# Patient Record
Sex: FEMALE | Race: WHITE
[De-identification: names, ages, dates, MRNs, and addresses within clinical notes are randomized per-mention and may not be internally consistent; named-entity substitution may affect disease eponyms.]

---

## 2017-10-25 ENCOUNTER — HOSPITAL ENCOUNTER (EMERGENCY)
Dept: HOSPITAL 92 - ERS | Age: 22
Discharge: HOME | End: 2017-10-25
Payer: SELF-PAY

## 2017-10-25 DIAGNOSIS — F32.9: ICD-10-CM
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
## 2017-10-25 DIAGNOSIS — S82.64XA: Primary | ICD-10-CM

## 2017-10-25 DIAGNOSIS — F41.9: ICD-10-CM

## 2017-10-25 PROCEDURE — 29515 APPLICATION SHORT LEG SPLINT: CPT

## 2017-10-25 NOTE — RAD
RIGHT ANKLE THREE VIEWS:

 

History: Right ankle pain, injury. 

 

FINDINGS: 

Soft tissue swelling is present. There is a fracture involving the lateral malleolus without signifi
cant displacement. The ankle mortise is maintained. 

 

IMPRESSION: 

Right lateral malleolar fracture. 

 

POS: [facility]ONATH[facility]

## 2019-02-10 ENCOUNTER — HOSPITAL ENCOUNTER (EMERGENCY)
Dept: HOSPITAL 92 - ERS | Age: 24
Discharge: HOME | End: 2019-02-10
Payer: SELF-PAY
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
## 2019-02-10 DIAGNOSIS — F41.9: ICD-10-CM

## 2019-02-10 DIAGNOSIS — S82.64XA: Primary | ICD-10-CM

## 2019-02-10 DIAGNOSIS — F32.9: ICD-10-CM

## 2019-02-10 DIAGNOSIS — J45.909: ICD-10-CM

## 2019-02-10 PROCEDURE — 29515 APPLICATION SHORT LEG SPLINT: CPT

## 2019-02-10 NOTE — RAD
THREE VIEWS OF THE RIGHT ANKLE:

 

COMPARISON: 

10/25/2017.

 

HISTORY: 

Right ankle pain.

 

FINDINGS: 

Three views right ankle show moderate lateral soft tissue swelling.  The previously seen fracture has
 healed.  There is no evidence of acute fracture or dislocation.  No degenerative changes are seen.

 

IMPRESSION: 

No evidence of acute osseous abnormality.

 

POS: LINDA